# Patient Record
Sex: FEMALE | NOT HISPANIC OR LATINO | Employment: PART TIME | URBAN - METROPOLITAN AREA
[De-identification: names, ages, dates, MRNs, and addresses within clinical notes are randomized per-mention and may not be internally consistent; named-entity substitution may affect disease eponyms.]

---
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
## 2023-04-19 ENCOUNTER — TELEPHONE (OUTPATIENT)
Dept: BEHAVIORAL HEALTH | Facility: CLINIC | Age: 18
End: 2023-04-19

## 2023-04-19 ENCOUNTER — HOSPITAL ENCOUNTER (OUTPATIENT)
Dept: BEHAVIORAL HEALTH | Facility: CLINIC | Age: 18
Discharge: HOME | End: 2023-04-19
Attending: FAMILY MEDICINE | Admitting: FAMILY MEDICINE
Payer: COMMERCIAL

## 2023-04-19 PROCEDURE — 90791 PSYCH DIAGNOSTIC EVALUATION: CPT | Performed by: COUNSELOR

## 2023-04-19 ASSESSMENT — ANXIETY QUESTIONNAIRES
2. NOT BEING ABLE TO STOP OR CONTROL WORRYING: NEARLY EVERY DAY
4. TROUBLE RELAXING: NEARLY EVERY DAY
GAD7 TOTAL SCORE: 18
GAD7 TOTAL SCORE: 18
6. BECOMING EASILY ANNOYED OR IRRITABLE: NEARLY EVERY DAY
5. BEING SO RESTLESS THAT IT IS HARD TO SIT STILL: NEARLY EVERY DAY
1. FEELING NERVOUS, ANXIOUS, OR ON EDGE: MORE THAN HALF THE DAYS
3. WORRYING TOO MUCH ABOUT DIFFERENT THINGS: NEARLY EVERY DAY
7. FEELING AFRAID AS IF SOMETHING AWFUL MIGHT HAPPEN: SEVERAL DAYS

## 2023-04-19 ASSESSMENT — COLUMBIA-SUICIDE SEVERITY RATING SCALE - C-SSRS
6. HAVE YOU EVER DONE ANYTHING, STARTED TO DO ANYTHING, OR PREPARED TO DO ANYTHING TO END YOUR LIFE?: NO
2. HAVE YOU ACTUALLY HAD ANY THOUGHTS OF KILLING YOURSELF IN THE PAST MONTH?: YES
4. HAVE YOU HAD THESE THOUGHTS AND HAD SOME INTENTION OF ACTING ON THEM?: YES
5. HAVE YOU STARTED TO WORK OUT OR WORKED OUT THE DETAILS OF HOW TO KILL YOURSELF? DO YOU INTEND TO CARRY OUT THIS PLAN?: NO
3. HAVE YOU BEEN THINKING ABOUT HOW YOU MIGHT KILL YOURSELF?: YES
1. IN THE PAST MONTH, HAVE YOU WISHED YOU WERE DEAD OR WISHED YOU COULD GO TO SLEEP AND NOT WAKE UP?: YES

## 2023-04-19 ASSESSMENT — PATIENT HEALTH QUESTIONNAIRE - PHQ9: SUM OF ALL RESPONSES TO PHQ QUESTIONS 1-9: 19

## 2023-04-19 NOTE — TELEPHONE ENCOUNTER
Pt have a in-person appt today at 11am with [facility]. Writer placed a call this morning to confirm in-person appt. Unable to get hold of pt at: [phone]. Writer left a vm with writer's call back number. Included in vm that we will wait for pt to show up for appt until 11:15am. If pt miss the appt or need to reschedule writer also included Intake's number to reschedule if needed.

## 2023-04-19 NOTE — PATIENT INSTRUCTIONS
"Therapy Appointment  Date: 05/3, 05/8, 05/15 and 05/22.   Time:11:00  Provider: Whitney Mendez  Address: [facility]   [address] [place] [address] [address]  [place] [address]  Phone: [phone]      [facility]    Outpatient Mental Health Services - Adult Safety Plan      PATIENT'S NAME: Renata Magana  MRN:   [id number]    Step 1: Warning signs / cues (Thoughts, images, mood, situation, behavior) that a crisis may be developing:  Thoughts: \"I don't matter\", \"People would be better off without me\" and \"I'm a burden\"  Images: obsessive thoughts of death or dying, flashbacks and visions of harm  Thinking Processes: ruminations (can't stop thinking about my problems), racing thoughts, intrusive thoughts (bothersome, unwanted thoughts that come out of nowhere), highly critical and negative thoughts and paranoia  Mood: worsening depression, hopelessness, helplessness, intense anger, intense worry, agitation and mood swings  Behaviors: isolating/withdrawing , using drugs, aggression, not taking care of myself, not taking care of my responsibilities, sleeping too much and increasing frequency and duration of dissociation  Situations: trauma      Step 2: Coping strategies - Things I can do to take my mind off of my problems without contacting another person (relaxation technique, physical activity):  Distress Tolerance Strategies:  relaxation activities, play with my pet , watch a funny movie and intense exercise for 2-3 minutes   Physical Activities: go for a walk  Focus on helpful thoughts:  \"This is temporary\", \"I will get through this\", \"It always passes\" and \"Ride the wave\"    Step 3: People and social settings that provide distraction:     movie theater, pet store/humane society, zoo, park and gym      Step 4: Remind myself of people and things that are important to me and worth living for:  Family      Step 5: When I am in crisis, I can ask these " people to help me use my safety plan:   Mitra Bills (mother) [phone]    Step 6: Making the environment safe:   remove alcohol, remove drugs, remove things I could use to hurt myself and be around others    Step 7: Professionals or agencies I can contact during a crisis:    Suicide Prevention Lifeline: 8-674-574-TALK (3472)  Crisis Text Line Service (available 24 hours a day, 7 days a week): Text [place] to 533438  Call  **CRISIS (550506) from a cell phone to talk to a team of professionals who can help you.    Crisis Services By [facility]: Phone Number:   [place]     [phone]   [place]    [phone]   [facility]    [phone]   [place]    [phone]   [place]    [phone]   [place] [phone]   [place]     [phone]     Call 911 or go to my nearest emergency department.     I helped develop this safety plan and agree to use it when needed.  I have been given a copy of this plan.      Completed by Provider Name/ Credentials:  Samina Albarado, [facility]  Today s date:  4/19/2023    Renata Magana wasprovided a copy of this Safety Plan.    Adapted from Safety Plan Template 2008 Roshni Pryor and Major Prater is reprinted with the express permission of the authors.  No portion of the Safety Plan Template may be reproduced without the express, written permission.  You can contact the authors at [email or web address] or [email or web address]

## 2023-04-19 NOTE — PROGRESS NOTES
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 Plan:    services are not indicated.   Modifications to assist communication are not indicated.   Additional disability accommodations are not indicated.      5. Collaboration:   Collaboration / coordination of treatment will be initiated with the following  support professionals: None.      6.  Referrals:   The following referral(s) will be initiated: [facility]. Next Scheduled Appointment:    Date: 05/3, 05/8, 05/15 and 05/22.   Time:11:00  Provider: Whitney Mendez  Address: [facility]   [address] [place] [address]  Phone: [phone]     A Release of Information has been obtained for the following: MORENITA was signed for outpatient therapist.     Emergency Contact Mitra Bills (mother) [phone] was obtained.      Clinical Substantiation/medical necessity for the above recommendations:      Clinical Substantiation  Summary: Patient is a 17 year old female with a history of Anxiety Disorder and Depression who presents for an assessment of mental health needs. Patient does not have a history of psychiatric hospitalizations. Patient has a history of SI and SIB. Denies any concerns with current alcohol use. The patient's acute suicide risk was determined to be high due to  "the following factors: suicidal thoughts and hx of Self injurious behaviors. Patient is not currently under the influence of alcohol or illicit substances, denies experiencing command hallucinations, and has no immediate access to firearms. The patient's acute risk could be higher if noncompliant with their treatment plan, medications, follow-up appointments or using illicit substances or alcohol. Protective factors include: forward/future oriented thinking, dedication to family/friends, daily obligations, structured day and committment to well-being      Placement/Program Barriers Identified: none    Referral: Individual therapy         7. MABLE:    MABLE:  Discussed the general effects of drugs and alcohol on health and well-being.  Recommendations:  Abstain from use.     8. Records:   These were reviewed at time of assessment. Information in this assessment was obtained from the medical record and provided by patient who is a good historian.    Patient will have open access to their mental health medical record.    9.   Interactive Complexity: No      Provider Name/ Credentials:  Samina Albarado, [facility], LADC    April 19, 2023                [facility] Health and Addiction Assessment [place]    Outpatient Mental Health Services - Adult Safety Plan      PATIENT'S NAME: Renata Magana  MRN:   [id number]    Step 1: Warning signs / cues (Thoughts, images, mood, situation, behavior) that a crisis may be developing:    Thoughts: \"I don't matter\", \"People would be better off without me\" and \"I'm a burden\"    Images: obsessive thoughts of death or dying, flashbacks and visions of harm    Thinking Processes: ruminations (can't stop thinking about my problems), racing thoughts, intrusive thoughts (bothersome, unwanted thoughts that come out of nowhere), highly critical and negative thoughts and paranoia    Mood: worsening depression, hopelessness, helplessness, intense anger, intense worry, agitation and mood " "swings    Behaviors: isolating/withdrawing , using drugs, aggression, not taking care of myself, not taking care of my responsibilities, sleeping too much and increasing frequency and duration of dissociation    Situations: trauma      Step 2: Coping strategies - Things I can do to take my mind off of my problems without contacting another person (relaxation technique, physical activity):    Distress Tolerance Strategies:  relaxation activities, play with my pet , watch a funny movie and intense exercise for 2-3 minutes     Physical Activities: go for a walk    Focus on helpful thoughts:  \"This is temporary\", \"I will get through this\", \"It always passes\" and \"Ride the wave\"    Step 3: People and social settings that provide distraction:       movie theater, pet store/humane society, zoo, park and gym      Step 4: Remind myself of people and things that are important to me and worth living for:  Family      Step 5: When I am in crisis, I can ask these people to help me use my safety plan:   Mitra Bills (mother) [phone]    Step 6: Making the environment safe:     remove alcohol, remove drugs, remove things I could use to hurt myself and be around others    Step 7: Professionals or agencies I can contact during a crisis:      Suicide Prevention Lifeline: 6-385-933-TALK (1682)    Crisis Text Line Service (available 24 hours a day, 7 days a week): Text [place] to 572029    Call  **CRISIS (764292) from a cell phone to talk to a team of professionals who can help you.    Crisis Services By [facility]: Phone Number:   [place]     [phone]   [place]    [phone]   [facility]    [phone]   [place]    [phone]   [place]    [phone]   [place] [phone]   [place]     [phone]       Call 911 or go to my nearest emergency department.     I helped develop this safety plan and agree to use it when needed.  I have been given a copy of this plan.      Completed by Provider Name/ Credentials:  Samina" Per [facility]  Today s date:  4/19/2023    Renata MARROQUIN Izzy was provided a copy of this Safety Plan.    Adapted from Safety Plan Template 2008 Roshni Pryor and Major Prater is reprinted with the express permission of the authors.  No portion of the Safety Plan Template may be reproduced without the express, written permission.  You can contact the authors at [email or web address] or [email or web address]

## 2023-05-20 ENCOUNTER — HEALTH MAINTENANCE LETTER (OUTPATIENT)
Age: 18
End: 2023-05-20

## 2024-07-27 ENCOUNTER — HEALTH MAINTENANCE LETTER (OUTPATIENT)
Age: 19
End: 2024-07-27

## 2025-08-10 ENCOUNTER — HEALTH MAINTENANCE LETTER (OUTPATIENT)
Age: 20
End: 2025-08-10